# Patient Record
(demographics unavailable — no encounter records)

---

## 2017-12-01 NOTE — OPPN
Date/Time of Note


Date/Time of Note


DATE: 12/1/17 


TIME: 10:33





Operative Report


Preoperative Diagnosis


Screening


Postoperative Diagnosis


Internal hemorrhoids


No colon neoplasm was identified


Operation/Procedure Performed


Colonoscopy


Surgeon


see signature line


First assist


None


Anesthesia:  MAC


Estimated blood loss:  none


Transfusion Required


   none


Specimen


None


Grafts/Implants


none


Complications


none











JEAN-CLAUDE CORTES MD Dec 1, 2017 10:34

## 2017-12-01 NOTE — GILP
DATE OF PROCEDURE:  

 

 

NAME OF PROCEDURE:  Colonoscopy.

 

SURGEON:  Jean-Claude barros MD

 

PREOPERATIVE DIAGNOSIS:  Screening colonoscopy.

 

POSTOPERATIVE DIAGNOSES

1.  Colonoscopy all the way to the cecum.

2.  Internal hemorrhoids.

3.  No colon neoplasm was identified.

 

INDICATION FOR THE PROCEDURE:  Ms. Charlene Garcia is a 55-year-old female patient who was scheduled 
for screening colonoscopy.

 

The procedure and possible complications are well explained to the patient.  The patient understood 
and consented to the procedure.

 

DESCRIPTION OF PROCEDURE:  Under the influence of anesthesia, the colonoscope was carefully introduc
ed in the rectum and under direct vision it was advanced all the way to the cecum.

 

FINDINGS:  The patient had internal hemorrhoids.  No colon neoplasm was identified.

 

She tolerated the procedure very well and there was no complication from the procedure.  At the end 
of the procedures, she was awake with stable vital signs and she was discharged home to the care of 
her family.

 

IMPRESSION:  Please see postoperative diagnoses.

 

PLAN:  Next screening colonoscopy in 10 years.

 

 

Dictated By: JEAN-CLAUDE WU/FERNANDO

DD:    12/01/2017 10:37:29

DT:    12/01/2017 12:44:39

Conf#: [id number]

DID#:  [id number]